# Patient Record
(demographics unavailable — no encounter records)

---

## 2024-12-12 NOTE — HISTORY OF PRESENT ILLNESS
[Patient reported mammogram was normal] : Patient reported mammogram was normal [Patient reported PAP Smear was normal] : Patient reported PAP Smear was normal [Y] : Positive pregnancy history [Currently Active] : currently active [Men] : men [No] : No [Mammogramdate] : 01/24 [TextBox_19] : Z&P [BreastSonogramDate] : 03/24 [TextBox_25] : Z&P [PapSmeardate] : 09/23 [ColonoscopyDate] : N/A [LMPDate] : 12/03/24 [PGxTotal] : 1 [[facility] HospitalxFulerm] : 1 [[facility] Campusiving] : 1 [FreeTextEntry1] : 1  SECTION

## 2024-12-12 NOTE — DISCUSSION/SUMMARY
[FreeTextEntry1] : 40-year-old para 1 here for annual GYN visit  Healthcare maintenance Pap HPV collected, Rx mammogram and breast ultrasound for history of breast calcifications given.counseled that illness can lead to a slightly off menstrual cycle, patient counseled and reassured.  Follow-up 1 year.